# Patient Record
Sex: MALE
[De-identification: names, ages, dates, MRNs, and addresses within clinical notes are randomized per-mention and may not be internally consistent; named-entity substitution may affect disease eponyms.]

---

## 2023-10-24 ENCOUNTER — APPOINTMENT (OUTPATIENT)
Dept: UROLOGY | Facility: CLINIC | Age: 68
End: 2023-10-24
Payer: MEDICARE

## 2023-10-24 VITALS
RESPIRATION RATE: 16 BRPM | HEART RATE: 79 BPM | SYSTOLIC BLOOD PRESSURE: 151 MMHG | TEMPERATURE: 98.1 F | HEIGHT: 68.39 IN | DIASTOLIC BLOOD PRESSURE: 82 MMHG | WEIGHT: 176 LBS | BODY MASS INDEX: 26.37 KG/M2 | OXYGEN SATURATION: 97 %

## 2023-10-24 DIAGNOSIS — N52.8 OTHER MALE ERECTILE DYSFUNCTION: ICD-10-CM

## 2023-10-24 PROCEDURE — 99204 OFFICE O/P NEW MOD 45 MIN: CPT

## 2023-10-24 RX ORDER — AMLODIPINE BESYLATE 10 MG/1
10 TABLET ORAL
Refills: 0 | Status: ACTIVE | COMMUNITY

## 2023-10-24 RX ORDER — ATORVASTATIN CALCIUM 20 MG/1
20 TABLET, FILM COATED ORAL
Refills: 0 | Status: ACTIVE | COMMUNITY

## 2024-01-22 ENCOUNTER — APPOINTMENT (OUTPATIENT)
Dept: UROLOGY | Facility: CLINIC | Age: 69
End: 2024-01-22
Payer: MEDICARE

## 2024-01-22 VITALS
HEIGHT: 60 IN | TEMPERATURE: 98 F | OXYGEN SATURATION: 97 % | DIASTOLIC BLOOD PRESSURE: 78 MMHG | RESPIRATION RATE: 16 BRPM | BODY MASS INDEX: 34.55 KG/M2 | WEIGHT: 176 LBS | HEART RATE: 84 BPM | SYSTOLIC BLOOD PRESSURE: 151 MMHG

## 2024-01-22 PROCEDURE — 99213 OFFICE O/P EST LOW 20 MIN: CPT

## 2024-01-22 RX ORDER — TADALAFIL 20 MG/1
20 TABLET ORAL
Qty: 30 | Refills: 5 | Status: ACTIVE | COMMUNITY
Start: 2023-10-24 | End: 1900-01-01

## 2024-01-22 NOTE — HISTORY OF PRESENT ILLNESS
[FreeTextEntry1] : FIONA SANTAMARIA Apr 1 1955  Language: English Date of First visit: 10/24/2023 Accompanied by: self Contact info: Referring Provider/PCP: Dr. Colton Hull Fax: [phone]  CC/ Problem List: ED Premature ejaculation  =============================================================================== FIRST VISIT / Summary: Very pleasant 68 year old M here for ED and PEJ. His partner is about 20 years younger than him and he is recently sexually active after several years not (out of jail 2021 then getting life together for a few years)  ED: he rates 7-8/10 without meds. Better with and longer duration with meds. He thinks overexcited, and at least some of the time loses erection after ejaculation.  ------------------------------------------------------------------------------------------- INTERVAL VISITS: The patient's medications and allergies were reviewed and edited below. Dated 01/22/2024   Discussed above again. He is using only the medication (no lidocaine etc).  ===============================================================================  PMH: HTN, HLD Meds: amlodipine, atorvastatin All: nkda FHx: No  malignancies SocHx: former smoking about 50 pack years, etoh beer socially, retired  PSH: 3 perineal fistulotomy. Right scrotal cyst ?hydrocele excision 2021. Vasectomy 1996  ROS: Review of Systems is as per HPI unless otherwise denoted below  =============================================================================== DATA: LABS (SELECTED):---------------------------------------------------------------------------------------------------   RADS:-------------------------------------------------------------------------------------------------------------------   PATHOLOGY/CYTOLOGY:-------------------------------------------------------------------------------------------   VOIDING STUDIES: ----------------------------------------------------------------------------------------------------   STONE STUDIES: (Twan/NANCY)----------------------------------------------------------------------------------   PROCEDURES: -----------------------------------------------------------------------------------------------    =============================================================================== PHYSICAL EXAM:   FOCUSED: ---------------------------------------------------------------------------------------------------------------- declined had severe infection perineum after colonoscopy  ======================================================================================= DISCUSSION: ======================================================================================= ASSESSMENT and PLAN  1. ED - has ok erection - recommend combine lidocaine jelly / cialis - start stop discussed - RTC in 3- 6 months  2. PSA screening - states had with Dr. Hull, value not available to me at this time  ======================================================================================= 3 Thank you for allowing me to assist in the care of your patient. Should you have any questions please do not hesitate to reach out to me.  Luke Vidal MD       [facility] Physician [facility]  [place] Office: [address], [address] [place] [address] T: [phone] F: [phone]  [place] Office: [address], 1st [place] [address] T: [phone] F: [phone]

## 2024-08-16 ENCOUNTER — APPOINTMENT (OUTPATIENT)
Dept: UROLOGY | Facility: CLINIC | Age: 69
End: 2024-08-16
Payer: MEDICARE

## 2024-08-16 VITALS
HEIGHT: 60 IN | WEIGHT: 175 LBS | SYSTOLIC BLOOD PRESSURE: 145 MMHG | TEMPERATURE: 98.3 F | BODY MASS INDEX: 34.36 KG/M2 | OXYGEN SATURATION: 97 % | DIASTOLIC BLOOD PRESSURE: 79 MMHG | HEART RATE: 81 BPM | RESPIRATION RATE: 16 BRPM

## 2024-08-16 DIAGNOSIS — N52.8 OTHER MALE ERECTILE DYSFUNCTION: ICD-10-CM

## 2024-08-16 DIAGNOSIS — Z12.5 ENCOUNTER FOR SCREENING FOR MALIGNANT NEOPLASM OF PROSTATE: ICD-10-CM

## 2024-08-16 PROCEDURE — 99213 OFFICE O/P EST LOW 20 MIN: CPT

## 2024-08-16 PROCEDURE — G2211 COMPLEX E/M VISIT ADD ON: CPT

## 2024-08-20 NOTE — HISTORY OF PRESENT ILLNESS
[FreeTextEntry1] : FIONA SANTAMARIA Apr 1 1955  Language: English Date of First visit: 10/24/2023 Accompanied by: self Contact info: Referring Provider/PCP: Dr. Colton Hull Fax: [phone]  CC/ Problem List: ED Premature ejaculation  =============================================================================== FIRST VISIT / Summary: Very pleasant 68 year old M here for ED and PEJ. His partner is about 20 years younger than him and he is recently sexually active after several years not (out of prison 2021 then getting life together for a few years)  ED: he rates 7-8/10 without meds. Better with and longer duration with meds. He thinks overexcited, and at least some of the time loses erection after ejaculation.  ------------------------------------------------------------------------------------------- INTERVAL VISITS: The patient's medications and allergies were reviewed and edited below. Dated  08/16/2024   He is using only the medication (no lidocaine etc) and he and partner are content. Happy with Cialis. Here for refills. No urinary complaints  ===============================================================================  PMH: HTN, HLD Meds: amlodipine, atorvastatin All: nkda FHx: No  malignancies SocHx: former smoking about 50 pack years, etoh beer socially, retired  PSH: 3 perineal fistulotomy. Right scrotal cyst ?hydrocele excision 2021. Vasectomy 1996  ROS: Review of Systems is as per HPI unless otherwise denoted below  =============================================================================== DATA: LABS (SELECTED):--------------------------------------------------------------------------------------------------- 7/27/2023 PSA 0.73   RADS:-------------------------------------------------------------------------------------------------------------------   PATHOLOGY/CYTOLOGY:-------------------------------------------------------------------------------------------   VOIDING STUDIES: ----------------------------------------------------------------------------------------------------   STONE STUDIES: (Analysis/LLSA)----------------------------------------------------------------------------------   PROCEDURES: -----------------------------------------------------------------------------------------------    =============================================================================== PHYSICAL EXAM:   FOCUSED: ---------------------------------------------------------------------------------------------------------------- declined had severe infection perineum after colonoscopy  ======================================================================================= DISCUSSION: ======================================================================================= ASSESSMENT and PLAN  1. ED -content with erection - continue Cialis-refills sent - start stop discussed - RTC as needed   2. PSA screening - PSA today   ======================================================================================= 3 Thank you for allowing me to assist in the care of your patient. Should you have any questions please do not hesitate to reach out to me.  Luke Vidal MD [facility] Physician [facility] for Urology  [place] Office: [address], [address] [place] [address] T: [phone] F: [phone]  [place] Office: [address] [place] [address], 1st [place] [address] T: [phone] F: [phone]

## 2024-08-20 NOTE — HISTORY OF PRESENT ILLNESS
[FreeTextEntry1] : FIONA SANTAMARIA Apr 1 1955  Language: English Date of First visit: 10/24/2023 Accompanied by: self Contact info: Referring Provider/PCP: Dr. Colton Hull Fax: [phone]  CC/ Problem List: ED Premature ejaculation  =============================================================================== FIRST VISIT / Summary: Very pleasant 68 year old M here for ED and PEJ. His partner is about 20 years younger than him and he is recently sexually active after several years not (out of prison 2021 then getting life together for a few years)  ED: he rates 7-8/10 without meds. Better with and longer duration with meds. He thinks overexcited, and at least some of the time loses erection after ejaculation.  ------------------------------------------------------------------------------------------- INTERVAL VISITS: The patient's medications and allergies were reviewed and edited below. Dated  08/16/2024   He is using only the medication (no lidocaine etc) and he and partner are content. Happy with Cialis. Here for refills. No urinary complaints  ===============================================================================  PMH: HTN, HLD Meds: amlodipine, atorvastatin All: nkda FHx: No  malignancies SocHx: former smoking about 50 pack years, etoh beer socially, retired  PSH: 3 perineal fistulotomy. Right scrotal cyst ?hydrocele excision 2021. Vasectomy 1996  ROS: Review of Systems is as per HPI unless otherwise denoted below  =============================================================================== DATA: LABS (SELECTED):--------------------------------------------------------------------------------------------------- 7/27/2023 PSA 0.73   RADS:-------------------------------------------------------------------------------------------------------------------   PATHOLOGY/CYTOLOGY:-------------------------------------------------------------------------------------------   VOIDING STUDIES: ----------------------------------------------------------------------------------------------------   STONE STUDIES: (Analysis/LLSA)----------------------------------------------------------------------------------   PROCEDURES: -----------------------------------------------------------------------------------------------    =============================================================================== PHYSICAL EXAM:   FOCUSED: ---------------------------------------------------------------------------------------------------------------- declined had severe infection perineum after colonoscopy  ======================================================================================= DISCUSSION: ======================================================================================= ASSESSMENT and PLAN  1. ED -content with erection - continue Cialis-refills sent - start stop discussed - RTC as needed   2. PSA screening - PSA today   ======================================================================================= 3 Thank you for allowing me to assist in the care of your patient. Should you have any questions please do not hesitate to reach out to me.  Luke Vidal MD [facility] Physician [facility] for Urology  [place] Office: [address], [address] [place] [address] T: [phone] F: [phone]  [place] Office: [address] [place] [address], 1st [place] [address] T: [phone] F: [phone]

## 2024-08-20 NOTE — HISTORY OF PRESENT ILLNESS
[FreeTextEntry1] : FIONA SANTAMARIA Apr 1 1955  Language: English Date of First visit: 10/24/2023 Accompanied by: self Contact info: Referring Provider/PCP: Dr. Colton Hull Fax: [phone]  CC/ Problem List: ED Premature ejaculation  =============================================================================== FIRST VISIT / Summary: Very pleasant 68 year old M here for ED and PEJ. His partner is about 20 years younger than him and he is recently sexually active after several years not (out of prison 2021 then getting life together for a few years)  ED: he rates 7-8/10 without meds. Better with and longer duration with meds. He thinks overexcited, and at least some of the time loses erection after ejaculation.  ------------------------------------------------------------------------------------------- INTERVAL VISITS: The patient's medications and allergies were reviewed and edited below. Dated  08/16/2024   He is using only the medication (no lidocaine etc) and he and partner are content. Happy with Cialis. Here for refills. No urinary complaints  ===============================================================================  PMH: HTN, HLD Meds: amlodipine, atorvastatin All: nkda FHx: No  malignancies SocHx: former smoking about 50 pack years, etoh beer socially, retired  PSH: 3 perineal fistulotomy. Right scrotal cyst ?hydrocele excision 2021. Vasectomy 1996  ROS: Review of Systems is as per HPI unless otherwise denoted below  =============================================================================== DATA: LABS (SELECTED):--------------------------------------------------------------------------------------------------- 7/27/2023 PSA 0.73   RADS:-------------------------------------------------------------------------------------------------------------------   PATHOLOGY/CYTOLOGY:-------------------------------------------------------------------------------------------   VOIDING STUDIES: ----------------------------------------------------------------------------------------------------   STONE STUDIES: (Analysis/LLSA)----------------------------------------------------------------------------------   PROCEDURES: -----------------------------------------------------------------------------------------------    =============================================================================== PHYSICAL EXAM:   FOCUSED: ---------------------------------------------------------------------------------------------------------------- declined had severe infection perineum after colonoscopy  ======================================================================================= DISCUSSION: ======================================================================================= ASSESSMENT and PLAN  1. ED -content with erection - continue Cialis-refills sent - start stop discussed - RTC as needed   2. PSA screening - PSA today   ======================================================================================= 3 Thank you for allowing me to assist in the care of your patient. Should you have any questions please do not hesitate to reach out to me.  Luke Vidal MD [facility] Physician [facility] for Urology  [place] Office: [address], [address] [place] [address] T: [phone] F: [phone]  [place] Office: [address] [place] [address], 1st [place] [address] T: [phone] F: [phone]

## 2024-08-21 LAB
PSA FREE FLD-MCNC: 21 %
PSA FREE SERPL-MCNC: 0.28 NG/ML
PSA SERPL-MCNC: 1.31 NG/ML

## 2025-03-05 ENCOUNTER — APPOINTMENT (OUTPATIENT)
Dept: HEART AND VASCULAR | Facility: CLINIC | Age: 70
End: 2025-03-05

## 2025-03-21 ENCOUNTER — NON-APPOINTMENT (OUTPATIENT)
Age: 70
End: 2025-03-21

## 2025-03-21 ENCOUNTER — APPOINTMENT (OUTPATIENT)
Dept: UROLOGY | Facility: CLINIC | Age: 70
End: 2025-03-21
Payer: MEDICARE

## 2025-03-21 VITALS
HEART RATE: 81 BPM | BODY MASS INDEX: 34.36 KG/M2 | TEMPERATURE: 96.9 F | RESPIRATION RATE: 16 BRPM | HEIGHT: 60 IN | DIASTOLIC BLOOD PRESSURE: 77 MMHG | WEIGHT: 175 LBS | OXYGEN SATURATION: 97 % | SYSTOLIC BLOOD PRESSURE: 152 MMHG

## 2025-03-21 DIAGNOSIS — Z12.5 ENCOUNTER FOR SCREENING FOR MALIGNANT NEOPLASM OF PROSTATE: ICD-10-CM

## 2025-03-21 DIAGNOSIS — N52.8 OTHER MALE ERECTILE DYSFUNCTION: ICD-10-CM

## 2025-03-21 PROCEDURE — 99213 OFFICE O/P EST LOW 20 MIN: CPT

## 2025-03-21 PROCEDURE — G2211 COMPLEX E/M VISIT ADD ON: CPT | Mod: NC

## 2025-03-21 RX ORDER — TADALAFIL 20 MG/1
20 TABLET ORAL
Qty: 30 | Refills: 7 | Status: ACTIVE | COMMUNITY
Start: 2025-03-21 | End: 1900-01-01